# Patient Record
Sex: MALE | Race: WHITE | NOT HISPANIC OR LATINO | Employment: FULL TIME | URBAN - METROPOLITAN AREA
[De-identification: names, ages, dates, MRNs, and addresses within clinical notes are randomized per-mention and may not be internally consistent; named-entity substitution may affect disease eponyms.]

---

## 2019-06-17 ENCOUNTER — TRANSFERRED RECORDS (OUTPATIENT)
Dept: HEALTH INFORMATION MANAGEMENT | Facility: CLINIC | Age: 34
End: 2019-06-17

## 2019-06-24 ENCOUNTER — TRANSFERRED RECORDS (OUTPATIENT)
Dept: HEALTH INFORMATION MANAGEMENT | Facility: CLINIC | Age: 34
End: 2019-06-24

## 2019-06-28 ENCOUNTER — TRANSFERRED RECORDS (OUTPATIENT)
Dept: HEALTH INFORMATION MANAGEMENT | Facility: CLINIC | Age: 34
End: 2019-06-28

## 2019-08-16 ENCOUNTER — TRANSFERRED RECORDS (OUTPATIENT)
Dept: HEALTH INFORMATION MANAGEMENT | Facility: CLINIC | Age: 34
End: 2019-08-16

## 2019-10-02 ENCOUNTER — TRANSFERRED RECORDS (OUTPATIENT)
Dept: HEALTH INFORMATION MANAGEMENT | Facility: CLINIC | Age: 34
End: 2019-10-02

## 2019-10-24 ENCOUNTER — TELEPHONE (OUTPATIENT)
Dept: NEUROLOGY | Facility: CLINIC | Age: 34
End: 2019-10-24

## 2019-10-24 ENCOUNTER — PRE VISIT (OUTPATIENT)
Dept: NEUROLOGY | Facility: CLINIC | Age: 34
End: 2019-10-24

## 2019-10-24 RX ORDER — LISINOPRIL 40 MG/1
40 TABLET ORAL
COMMUNITY
Start: 2019-08-19

## 2019-10-24 RX ORDER — TRAMADOL HYDROCHLORIDE 50 MG/1
TABLET ORAL
COMMUNITY
Start: 2019-09-23

## 2019-10-24 RX ORDER — CYCLOBENZAPRINE HCL 10 MG
10 TABLET ORAL
COMMUNITY
Start: 2019-08-23 | End: 2020-08-27

## 2019-10-24 RX ORDER — HYDROCHLOROTHIAZIDE 25 MG/1
TABLET ORAL
COMMUNITY
Start: 2019-08-05 | End: 2020-08-16

## 2019-10-24 RX ORDER — OMEPRAZOLE 40 MG/1
40 CAPSULE, DELAYED RELEASE ORAL
COMMUNITY
Start: 2019-08-29

## 2019-10-24 RX ORDER — ACETAMINOPHEN 500 MG
TABLET ORAL
COMMUNITY

## 2019-10-24 RX ORDER — GABAPENTIN 300 MG/1
600 CAPSULE ORAL 3 TIMES DAILY
COMMUNITY
Start: 2019-10-16

## 2019-10-24 RX ORDER — SUCRALFATE 1 G/1
1 TABLET ORAL
COMMUNITY
Start: 2019-10-01 | End: 2019-12-30

## 2019-10-24 RX ORDER — MULTIVITAMIN WITH IRON
1 TABLET ORAL DAILY
COMMUNITY

## 2019-10-24 RX ORDER — TOPIRAMATE 50 MG/1
50 TABLET, FILM COATED ORAL
COMMUNITY
Start: 2019-10-01 | End: 2020-10-05

## 2019-10-24 NOTE — TELEPHONE ENCOUNTER
October 24, 2019              Tyesha Sierra CMA     Chief Complaint   Patient presents with     Previsit     Completed       Pre-Visit Documentation: Maurice Celestin is a 33 year old male  - patient stated he has not had any healthcare services outside of the [facility].    Current Outpatient Medications   Medication Sig Dispense Refill     acetaminophen (TYLENOL) 500 MG tablet        cyclobenzaprine (FLEXERIL) 10 MG tablet Take 10 mg by mouth       gabapentin (NEURONTIN) 300 MG capsule 600 mg 3 times daily Patient reports taking 3 caps in the AM, 3 caps in the afternoon and 3 caps in the PM       hydrochlorothiazide (HYDRODIURIL) 25 MG tablet        lisinopril (PRINIVIL/ZESTRIL) 40 MG tablet Take 40 mg by mouth       magnesium 250 MG tablet Take 1 tablet by mouth daily       omeprazole (PRILOSEC) 40 MG DR capsule Take 40 mg by mouth       sucralfate (CARAFATE) 1 GM tablet Take 1 g by mouth       topiramate (TOPAMAX) 50 MG tablet Take 50 mg by mouth       traMADol (ULTRAM) 50 MG tablet TAKE 1 TO 2 TABLETS(50  MG) BY MOUTH EVERY 6 HOURS AS NEEDED FOR SEVERE PAIN         ASSESSMENT / PLAN:  No diagnosis found.    Tyesha Sierra CMA

## 2019-10-24 NOTE — TELEPHONE ENCOUNTER
We received imaging reports and disk from [place] - sent to our imaging department for scanning.        Tyesha Sierra CMA

## 2019-10-25 ENCOUNTER — OFFICE VISIT (OUTPATIENT)
Dept: NEUROLOGY | Facility: CLINIC | Age: 34
End: 2019-10-25
Payer: COMMERCIAL

## 2019-10-25 VITALS
HEART RATE: 94 BPM | OXYGEN SATURATION: 99 % | WEIGHT: 299.8 LBS | SYSTOLIC BLOOD PRESSURE: 125 MMHG | DIASTOLIC BLOOD PRESSURE: 81 MMHG

## 2019-10-25 DIAGNOSIS — G60.9 IDIOPATHIC SMALL FIBER PERIPHERAL NEUROPATHY: Primary | ICD-10-CM

## 2019-10-25 PROCEDURE — 99202 OFFICE O/P NEW SF 15 MIN: CPT | Performed by: PSYCHIATRY & NEUROLOGY

## 2019-10-25 ASSESSMENT — PAIN SCALES - GENERAL: PAINLEVEL: EXTREME PAIN (8)

## 2019-10-25 NOTE — PROGRESS NOTES
[id number]            Mental state: Alert, appropriate, speech, language, and thought content normal.     Cranial nerves II-XII normal.    Sensory examination:   Right Left   Light touch Normal Normal   Vibration (timed)     Vibration (Rydell-Seiffer) 8 8   Pin MF MF   Position     Legend:   MM = medial malleolus, TT = tibial tuberosity, K = patella, MCP = MCP joint  MF = mid-foot, DC = distal calf, MC = mid calf, PC = proximal calf      Manual muscle testing (A indicates atrophy):   Right Left   Shoulder abduction  5 5   Elbow extension 5 5   Elbow flexion 5 5   Wrist extension  5 5   Finger extension 5 5   FDI 5 5   APB 5 5   Hip flexion 5 5   Knee flexion 5 5   Knee extension 5 5   Dorsiflexion 5 5   Plantar flexion 5 5     Muscle tone:   Right Left   Upper limb Normal Normal   Lower limb Normal Normal        Reflexes:   Right Left   Triceps 2 2   Biceps Tr 0   Brachioradialis 0 0   Patellar 2 2   Achilles 2 2   Plantar Flexor Flexor   Clonus Absent Absent      Coordination:  Finger-nose normal.  Heel-shin normal.  RRMs normal.    Gait:  Antalgic. Romberg negative.      Impression:      Recommendations:

## 2019-10-25 NOTE — NURSING NOTE
Maurice Celestin's goals for this visit include:   Chief Complaint   Patient presents with     Consult       He requests these members of his care team be copied on today's visit information:     PCP: Christie Quesada    Referring Provider:  Kota Singer MD  [facility]  [address] [place] [id number]    /81 (BP Location: Left arm, Patient Position: Sitting, Cuff Size: Adult Large)   Pulse 94   Wt 136 kg (299 lb 12.8 oz)   SpO2 99%     Do you need any medication refills at today's visit? No

## 2019-10-25 NOTE — LETTER
10/25/2019         RE: Maurice Celestin  [address]  [facility] [address]        Dear Colleague,    Thank you for referring your patient, Maurice Celestin, to the [facility]. Please see a copy of my visit note below.    [id number]            Mental state: Alert, appropriate, speech, language, and thought content normal.     Cranial nerves II-XII normal.    Sensory examination:   Right Left   Light touch Normal Normal   Vibration (timed)     Vibration (Rydell-Seiffer) 8 8   Pin MF MF   Position     Legend:   MM = medial malleolus, TT = tibial tuberosity, K = patella, MCP = MCP joint  MF = mid-foot, DC = distal calf, MC = mid calf, PC = proximal calf      Manual muscle testing (A indicates atrophy):   Right Left   Shoulder abduction  5 5   Elbow extension 5 5   Elbow flexion 5 5   Wrist extension  5 5   Finger extension 5 5   FDI 5 5   APB 5 5   Hip flexion 5 5   Knee flexion 5 5   Knee extension 5 5   Dorsiflexion 5 5   Plantar flexion 5 5     Muscle tone:   Right Left   Upper limb Normal Normal   Lower limb Normal Normal        Reflexes:   Right Left   Triceps 2 2   Biceps Tr 0   Brachioradialis 0 0   Patellar 2 2   Achilles 2 2   Plantar Flexor Flexor   Clonus Absent Absent      Coordination:  Finger-nose normal.  Heel-shin normal.  RRMs normal.    Gait:  Antalgic. Romberg negative.      Impression:      Recommendations:          2091         Christie Quesada NP   [address] [address]      Patient:  Maurice Celestin   MRN:  [id number]   :  1985      Dear Doctors:      I saw Maurice Celestin in consultation today for further evaluation of small fiber neuropathy.  Mr. Celestin is a 33-year-old man who reports an episode of severe vertigo in mid  of this year.  His blood pressure was 240/150 on that day when it was taken by his mother who is a nurse.   He reports having had a history of hypertension, but had not been undergoing treatment at the time.   Since then, he has developed a syndrome of tremors, as well as paresthesias in the feet.      The patient's full past medical history, social history, allergy list, medication list, family history, occupational history and system review are documented in the medical record and were personally reviewed by me today.  He reports having on average about 1 alcoholic beverage a day.  He does smoke.  There is no family history of similar neurological problems.      NEUROLOGIC:  As follows:     Mental state: Alert, appropriate, speech, language, and thought content normal.      Cranial nerves II-XII normal.     Sensory examination:    Right Left   Light touch Normal Normal   Vibration (timed)       Vibration (Rydell-Seiffer) 8 8   Pin MF MF   Position       Legend:   MM = medial malleolus, TT = tibial tuberosity, K = patella, MCP = MCP joint  MF = mid-foot, DC = distal calf, MC = mid calf, PC = proximal calf        Manual muscle testing (A indicates atrophy):    Right Left   Shoulder abduction        5 5   Elbow extension 5 5   Elbow flexion 5 5   Wrist extension         5 5   Finger extension 5 5   FDI 5 5   APB 5 5   Hip flexion 5 5   Knee flexion 5 5   Knee extension 5 5   Dorsiflexion 5 5   Plantar flexion 5 5      Muscle tone:    Right Left   Upper limb Normal Normal   Lower limb Normal Normal         Reflexes:    Right Left   Triceps 2 2   Biceps Tr 0   Brachioradialis 0 0   Patellar 2 2   Achilles 2 2   Plantar Flexor Flexor   Clonus Absent Absent      Coordination:  Finger-nose normal.  Heel-shin normal.  RRMs normal.     Gait:  Antalgic. Romberg negative.      MEDICAL RECORD REVIEW:  Brain MRI without contrast was normal.  Spinal fluid is entirely normal.  Routine chemistries and extensive serologies are unremarkable.      Finally, note that the patient does have a fine postural tremor which he and his family indicate has improved with gabapentin, although he still has an internal sense of shakiness.      Epidermal  nerve fiber density was reduced in the foot and normal in the thigh.      I explained the following to Mr. Malin, his wife and his mother:  I would extend the laboratory studies for small fiber neuropathy to include a hemoglobin A1c, 2-hour oral glucose tolerance test, Sjogren antibodies and Sjogren antibodies, HCV and HIV titers, serum cryoglobulin and rheumatoid factor.  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     With regard to his broader syndrome complex, I do not think that this is explained on the basis of small fiber neuropathy.  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  Treatments are available but require some effort and time.  I explained that our movement disorders providers can see him for the tremulousness and have some expertise in assessing and making recommendations in this area.            Sincerely,      EYAD BOND MD             D: 10/25/2019   T: 10/25/2019   MT: KATHY      Name:     RUDY MALIN   MRN:      [id number]        Account:      [id number]   :      1985      Document: [id number]       cc: Christie Singer MD       Again, thank you for allowing me to participate in the care of your patient.        Sincerely,        Eyad Bond MD

## 2019-10-26 NOTE — PROGRESS NOTES
2091         Christie Quesada NP   [address] [address]      Patient:  Maurice Celestin   MRN:  [id number]   :  1985      Dear Doctors:      I saw Maurice Celestin in consultation today for further evaluation of small fiber neuropathy.  Mr. Celestin is a 33-year-old man who reports an episode of severe vertigo in mid  of this year.  His blood pressure was 240/150 on that day when it was taken by his mother who is a nurse.   He reports having had a history of hypertension, but had not been undergoing treatment at the time.  Since then, he has developed a syndrome of tremors, as well as paresthesias in the feet.      The patient's full past medical history, social history, allergy list, medication list, family history, occupational history and system review are documented in the medical record and were personally reviewed by me today.  He reports having on average about 1 alcoholic beverage a day.  He does smoke.  There is no family history of similar neurological problems.      NEUROLOGIC:  As follows:     Mental state: Alert, appropriate, speech, language, and thought content normal.      Cranial nerves II-XII normal.     Sensory examination:    Right Left   Light touch Normal Normal   Vibration (timed)       Vibration (Rydell-Seiffer) 8 8   Pin MF MF   Position       Legend:   MM = medial malleolus, TT = tibial tuberosity, K = patella, MCP = MCP joint  MF = mid-foot, DC = distal calf, MC = mid calf, PC = proximal calf        Manual muscle testing (A indicates atrophy):    Right Left   Shoulder abduction        5 5   Elbow extension 5 5   Elbow flexion 5 5   Wrist extension         5 5   Finger extension 5 5   FDI 5 5   APB 5 5   Hip flexion 5 5   Knee flexion 5 5   Knee extension 5 5   Dorsiflexion 5 5   Plantar flexion 5 5      Muscle tone:    Right Left   Upper limb Normal Normal   Lower limb Normal Normal         Reflexes:    Right Left   Triceps 2 2   Biceps Tr 0    Brachioradialis 0 0   Patellar 2 2   Achilles 2 2   Plantar Flexor Flexor   Clonus Absent Absent      Coordination:  Finger-nose normal.  Heel-shin normal.  RRMs normal.     Gait:  Antalgic. Romberg negative.      MEDICAL RECORD REVIEW:  Brain MRI without contrast was normal.  Spinal fluid is entirely normal.  Routine chemistries and extensive serologies are unremarkable.      Finally, note that the patient does have a fine postural tremor which he and his family indicate has improved with gabapentin, although he still has an internal sense of shakiness.      Epidermal nerve fiber density was reduced in the foot and normal in the thigh.      I explained the following to Mr. Malin, his wife and his mother:  I would extend the laboratory studies for small fiber neuropathy to include a hemoglobin A1c, 2-hour oral glucose tolerance test, Sjogren antibodies and Sjogren antibodies, HCV and HIV titers, serum cryoglobulin and rheumatoid factor.  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     With regard to his broader syndrome complex, I do not think that this is explained on the basis of small fiber neuropathy.  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  Treatments are available but require some effort and time.  I explained that our movement disorders providers can see him for the tremulousness and have some expertise in assessing and making recommendations in this area.            Sincerely,      EYAD BOND MD             D: 10/25/2019   T: 10/25/2019   MT: KATHY      Name:     RUDY MALIN    MRN:      [id number]        Account:      [id number]   :      1985      Document: [id number]       cc: Christie Singer MD

## 2019-10-31 ENCOUNTER — PRE VISIT (OUTPATIENT)
Dept: NEUROLOGY | Facility: CLINIC | Age: 34
End: 2019-10-31

## 2019-10-31 NOTE — TELEPHONE ENCOUNTER
FUTURE VISIT INFORMATION      FUTURE VISIT INFORMATION:    Date: 12/3/2019    Time: 3pm    Location: [facility]  REFERRAL INFORMATION:    Referring provider:  Dr. Chand     Referring providers clinic:  [facility]     Reason for visit/diagnosis  Tremors     RECORDS REQUESTED FROM:       Clinic name Comments Records Status Imaging Status   [facility] Everywhere PACS

## 2019-12-03 ENCOUNTER — OFFICE VISIT (OUTPATIENT)
Dept: NEUROLOGY | Facility: CLINIC | Age: 34
End: 2019-12-03
Payer: COMMERCIAL

## 2019-12-03 VITALS
SYSTOLIC BLOOD PRESSURE: 109 MMHG | HEIGHT: 78 IN | HEART RATE: 107 BPM | WEIGHT: 290 LBS | OXYGEN SATURATION: 98 % | BODY MASS INDEX: 33.55 KG/M2 | DIASTOLIC BLOOD PRESSURE: 72 MMHG

## 2019-12-03 DIAGNOSIS — R26.9 GAIT DISORDER: ICD-10-CM

## 2019-12-03 DIAGNOSIS — R25.1 TREMOR: ICD-10-CM

## 2019-12-03 DIAGNOSIS — R25.1 TREMOR: Primary | ICD-10-CM

## 2019-12-03 LAB — T4 FREE SERPL-MCNC: 0.7 NG/DL (ref 0.76–1.46)

## 2019-12-03 ASSESSMENT — MIFFLIN-ST. JEOR: SCORE: 2388.68

## 2019-12-03 ASSESSMENT — PATIENT HEALTH QUESTIONNAIRE - PHQ9
10. IF YOU CHECKED OFF ANY PROBLEMS, HOW DIFFICULT HAVE THESE PROBLEMS MADE IT FOR YOU TO DO YOUR WORK, TAKE CARE OF THINGS AT HOME, OR GET ALONG WITH OTHER PEOPLE: EXTREMELY DIFFICULT
SUM OF ALL RESPONSES TO PHQ QUESTIONS 1-9: 18
SUM OF ALL RESPONSES TO PHQ QUESTIONS 1-9: 18

## 2019-12-03 ASSESSMENT — PAIN SCALES - GENERAL: PAINLEVEL: EXTREME PAIN (8)

## 2019-12-03 NOTE — NURSING NOTE
Chief Complaint   Patient presents with     Consult     [facility] MOVEMENT DISORDER TREMORS      Alejandrina Fuller, EMT

## 2019-12-03 NOTE — LETTER
12/3/2019       RE: Maurice Celestin  [address]  [facility] [address]     Dear Colleague,    Thank you for referring your patient, Maurice Celestin, to the [facility] at [facility]. Please see a copy of my visit note below.    Department of Neurology  Movement Disorders Division   Initial Clinic Evaluation     Patient: Maurice Celestin   MRN: [id number]   : 1985   Date of Visit: 12/3/2019     Referring Physician: Mannie    Reason for Referral: Tremor    Impression:  1.  Exaggerated physiologic tremor  2.  Multiple somatic complaints, including headache, gait disorder, numbness and migratory pain  3.  Markedly elevated blood pressure in past, normal today    The one thing that I would want to rule out would be a pheochromocytoma.  This is unlikely but some of his symptoms could be explained by an extreme hyperadrenergic state.  I am going to check 24-hour metanephrines.  If these are normal I think it rules out pheochromocytoma.  I will also check a free T4 as his sensitive TSH was on the low side.  I will check a paraneoplastic panel to look for the acetylcholine receptor ganglionic antibody although I doubt that he has the syndrome of acute dysautonomia.    I was diana with the patient and indicated that if these tests were negative the most likely diagnosis would be a hyperadrenergic state due to anxiety.  The patient and his family are  strongly against considering this as a diagnostic possibility.  We did talk about doing autonomic testing at [facility] and I could order this if other testing were negative.    Recommendations:  1.  24-hour urinary metanephrines  2.  Spot fractionated catecholamines to see if he is hyperadrenergic for any reason  3.  Free T4  4.  Paraneoplastic panel    I will call the patient with laboratory results.     Please call or write with questions or concerns,    Sincerely yours,    Leonid Bravo MD ,  "MD      History of Present Illness  Mr. Celestin is a 33 year old male who is right-handed.  He has not worked since June when he had onset of his current illness.  The patient comes today with his wife and his mother.  He is referred by Doug Chand for evaluation of tremor.    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  He had a sudden ictus.  He had a hot sensation in his pharynx and then had a sensation of pressure in his head.  He collapsed against his side window but did not pass out.  He became dizzy and lightheaded.  He eemed to sway back-and-forth in his visual field.  He became nauseated and tried not to vomit.  He was able to get his head up off the window.  He drove to work and did work that day for several hours.  He was not feeling well.  After work he went to his mother's and his blood pressure was 250/147.  He did not go to the hospital.  On the way home he checked his blood pressure at a drugstore and it was 170/100.  He states that since that day \"nothing is the same\".  He says it \"really wrote something in my system\".  Nothing seems to work the way it did when he felt well.    He states that 1 of his symptoms his tremor.  He has mainly a sense of internal tremor.  At other times his limbs including his arms and legs will tremor severely and fasciculate.  At nighttime his mother notes his muscles will tremor especially in his feet.  He notices his muscles jerking like myoclonus during sleep.  He saw Dr. Singer a neurologist in his home area.  Gabapentin was tried and recently increased to 900 mg 3 times a day.  He feels this is helpful.  Flexeril was also added for sleep.    He says the tremor is now interfering with his action.  He tried to do some wood-burning.  He would hold his hand tightly to keep it from shaking.  When he did that his left leg began to shake violently.  He has twitching of his pectoralis muscles.  He states he can feel twitching in his stomach or abdomen.    He " "has had digestive issues.  His stomach hurts all the time.  He says he can \"feel digestion.  He has random pains throughout his body especially in his neck.  He sleeps poorly every night.  His vision is foggy.  He is developed severe headaches.  He also has episodes of severe sweating.  He does not have episodes of palpitation.    Given his dramatic presentation Dr. Singer has done a full work-up.  An MRI of the head was performed and I reviewed this and its entirely normal.  The patient had a spinal fluid exam.  He had 2 new chelated cells.  His protein was 43 and glucose 78.  There were no oligoclonal bands.  Lyme titer was negative.  West Nile titer was negative.  Ehrlichia/Anaplasma was negative and PCR in the blood.  Sensitive TSH was below 1 but within normal limits.  HIV titer is normal.  I believe an EMG was negative.  An epidermal nerve fiber density was done and said to be reduced.  Dr. Doug dave saw him for this and feels that small fiber neuropathy is unlikely and does not explain the patient's problems.    Past Medical History:   No past medical history on file.    Past Surgical History:   No past surgical history on file.    Medications:  Current Outpatient Medications   Medication Sig Dispense Refill     acetaminophen (TYLENOL) 500 MG tablet        cyclobenzaprine (FLEXERIL) 10 MG tablet Take 10 mg by mouth       gabapentin (NEURONTIN) 300 MG capsule 600 mg 3 times daily Patient reports taking 3 caps in the AM, 3 caps in the afternoon and 3 caps in the PM       hydrochlorothiazide (HYDRODIURIL) 25 MG tablet        lisinopril (PRINIVIL/ZESTRIL) 40 MG tablet Take 40 mg by mouth       magnesium 250 MG tablet Take 1 tablet by mouth daily       omeprazole (PRILOSEC) 40 MG DR capsule Take 40 mg by mouth       sucralfate (CARAFATE) 1 GM tablet Take 1 g by mouth       topiramate (TOPAMAX) 50 MG tablet Take 50 mg by mouth       traMADol (ULTRAM) 50 MG tablet TAKE 1 TO 2 TABLETS(50  MG) BY MOUTH EVERY 6 " HOURS AS NEEDED FOR SEVERE PAIN       UNABLE TO FIND MEDICATION NAME: Sulfur zyme [place]       HEMP OIL OR EXTRACT OR OTHER CBD CANNABINOID, NOT MEDICAL CANNABIS, 30 mg            Movement Disorder-related Medications                                                                                                                                                             Allergies: is allergic to meperidine; neomycin-bacitracin-polymyxin; and sertraline.    Social History:   Social History     Socioeconomic History     Marital status:      Spouse name: None     Number of children: None     Years of education: None     Highest education level: None   Occupational History     None   Social Needs     Financial resource strain: None     Food insecurity:     Worry: None     Inability: None     Transportation needs:     Medical: None     Non-medical: None   Tobacco Use     Smoking status: Current Every Day Smoker     Packs/day: 1.00     Smokeless tobacco: Never Used   Substance and Sexual Activity     Alcohol use: None     Drug use: None     Sexual activity: None   Lifestyle     Physical activity:     Days per week: None     Minutes per session: None     Stress: None   Relationships     Social connections:     Talks on phone: None     Gets together: None     Attends [employer] service: None     Active member of club or organization: None     Attends meetings of clubs or organizations: None     Relationship status: None     Intimate partner violence:     Fear of current or ex partner: None     Emotionally abused: None     Physically abused: None     Forced sexual activity: None   Other Topics Concern     None   Social History Narrative     None       Family History:  No family history on file.    ROS:    Neurologic  Visual loss: YES,fogginess Double vision: no, Headache: YES, Loss of consciousness:  no, Seizure: no, Fainting (syncope): no, Dysarthria: no, Dysphagia:  no, Vertigo:  no, Weakness: no, Atrophy: no,  Twitches: no, Lhermitte's: no, Numbness or tingling: no, Handwriting change: no, Tremors: no, Involuntary movements: no, Imbalance: no, Abnormal gait:  no, Falls :no, Memory loss: no, RBD: no, Sleep disorder: no, Hallucination: no, Loss of concentration: no,  Behavioral change: no,  Loss of motivation: no      Comprehensive Neurologic Exam    Mental Status Exam   The patient is alert, oriented and exhibits no difficulty with cognition or memory.  A formal short test of mental status was performed.     Neurovascular         Speech and Language   Right Left     Carotid Bruit Absent Absent  Speech is normal.   Dorsalis Pedis Pulse Normal Normal  Description   Posterior Tibial Pulse Normal Normal  Language is normal.     Cranial Nerve Exam                 Right Left   Right Left   II                                        Normal Normal  Hearing (VIII) Normal Normal      III, IV, V!                   Normal Normal  Nystagmus (VIII) Normal Normal   EOM description                              Gag (IX, X) Normal Normal   Facial Sensation (V) Normal Normal  SCM (XI) Normal Normal   Muscles of Mastication (V) Normal Normal  Trapezius (XI) Normal Normal   Facial Strength (VII) Normal Normal  Tongue (XII) Normal Normal     Motor Exam  Strength (*/5 grading)  Muscle                  Right Left  Muscle Right Left   Frontalis                                           Normal Normal  Iliopsoas Normal    Normal          Orbicularis Oculi                     Normal Normal  Quadrideps Normal    Normal        Orbicularis Oris                         Normal Normal  Anterior Tibial Normal Normal      Deltoid Normal Normal  Gastrocnemius Normal    Normal   Biceps Normal Normal  Extensor Hallucis Longus Normal    Normal   Triceps Normal Normal  Toe Extensors Normal    Normal   EDC Normal Normal  Toe Flexor Normal    Normal   Finger Flexors Normal Normal  Other Normal Normal   First Dorsal Interosseous Normal Normal  Other Normal Normal    Hypothenar Normal Normal  Other Normal Normal   Thenar Normal Normal  Other Normal Normal     Reflexes      Tone   Right Left   Right Left   Biceps Normal Normal  Spasticity (S)  Rigidity (R)     Triceps Normal Normal  Neck     Brachioradialis Normal Normal  Arm     Quadriceps Normal Normal  Leg     Ankle Normal Normal       Babkinski Flexor Flexor         AMR       Coordination   Right Left   Right Left   Fingers Normal Normal  Finger nose finger Normal Normal   Hand Normal Normal  Drift Normal Normal   Leg Normal Normal  Heel Esquivel Normal Normal   Foot Normal Normal  Other     Other               Involuntary Movements    Gait and Station  None            Right Left  Stand & Sit Normal   (Movement type) Normal Normal  Gait Normal   (Movement type) Normal Normal  Tandem Normal   (Movement type) Normal Normal  Romberg Absent   Fine 9 hz action tremor both hands    Sensory Exam          Right Left    Pin Normal Normal    Vibration Normal Normal    Joint position Normal Normal    Other           Coding statement:     Duration of  Services: face-to-face 80 min.   Greater than 50% of this visit was spent in counseling and coordination of care.    Again, thank you for allowing me to participate in the care of your patient.      Sincerely,    Leonid Bravo MD

## 2019-12-03 NOTE — PROGRESS NOTES
Department of Neurology  Movement Disorders Division   Initial Clinic Evaluation     Patient: Maurice Celestin   MRN: [id number]   : 1985   Date of Visit: 12/3/2019     Referring Physician: Mannie    Reason for Referral: Tremor    Impression:  1.  Exaggerated physiologic tremor  2.  Multiple somatic complaints, including headache, gait disorder, numbness and migratory pain  3.  Markedly elevated blood pressure in past, normal today    The one thing that I would want to rule out would be a pheochromocytoma.  This is unlikely but some of his symptoms could be explained by an extreme hyperadrenergic state.  I am going to check 24-hour metanephrines.  If these are normal I think it rules out pheochromocytoma.  I will also check a free T4 as his sensitive TSH was on the low side.  I will check a paraneoplastic panel to look for the acetylcholine receptor ganglionic antibody although I doubt that he has the syndrome of acute dysautonomia.    I was diana with the patient and indicated that if these tests were negative the most likely diagnosis would be a hyperadrenergic state due to anxiety.  The patient and his family are  strongly against considering this as a diagnostic possibility.  We did talk about doing autonomic testing at [facility] and I could order this if other testing were negative.    Recommendations:  1.  24-hour urinary metanephrines  2.  Spot fractionated catecholamines to see if he is hyperadrenergic for any reason  3.  Free T4  4.  Paraneoplastic panel    I will call the patient with laboratory results.       Please call or write with questions or concerns,    Sincerely yours,    Leonid Bravo MD , MD      History of Present Illness  Mr. Celestin is a 33 year old male who is right-handed.  He has not worked since  when he had onset of his current illness.  The patient comes today with his wife and his mother.  He is referred by Doug Chand for evaluation of tremor.    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  He had a sudden ictus.  He had a hot sensation in his pharynx and then had a sensation of pressure in his head.  He collapsed against his side window but did not pass out.  He became dizzy and lightheaded.  He eemed to sway back-and-forth in his visual field.  He became nauseated and tried not to vomit.  He was able to get his head up off the window.  He drove to work and did work that day for several hours.  He was not feeling well.  After work he went to his mother's and his blood pressure was 250/147.  He did not go to the hospital.  On the way home he checked his blood pressure at a drugstore and it was 170/100.  He states that since that day \"nothing is the same\".  He says it \"really wrote something in my system\".  Nothing seems to work the way it did when he felt well.    He states that 1 of his symptoms his tremor.  He has mainly a sense of internal tremor.  At other times his limbs including his arms and legs will tremor severely and fasciculate.  At nighttime his mother notes his muscles will tremor especially in his feet.  He notices his muscles jerking like myoclonus during sleep.  He saw Dr. Singer a neurologist in his home area.  Gabapentin was tried and recently increased to 900 mg 3 times a day.  He feels this is helpful.  Flexeril was also added for sleep.    He says the tremor is now interfering with his action.  He tried to do some wood-burning.  He would hold his hand tightly to keep it from shaking.  When he did that his left leg began to shake violently.  He has twitching of his pectoralis muscles.  He states he can feel twitching in his stomach or abdomen.    He has had digestive issues.  His stomach hurts all the time.  He says he can \"feel digestion.  He has random pains throughout his body especially in his neck.  He sleeps poorly every night.  His vision is foggy.  He is developed severe headaches.  He also has episodes of severe " sweating.  He does not have episodes of palpitation.    Given his dramatic presentation Dr. Singer has done a full work-up.  An MRI of the head was performed and I reviewed this and its entirely normal.  The patient had a spinal fluid exam.  He had 2 new chelated cells.  His protein was 43 and glucose 78.  There were no oligoclonal bands.  Lyme titer was negative.  West Nile titer was negative.  Ehrlichia/Anaplasma was negative and PCR in the blood.  Sensitive TSH was below 1 but within normal limits.  HIV titer is normal.  I believe an EMG was negative.  An epidermal nerve fiber density was done and said to be reduced.  Dr. Doug dave saw him for this and feels that small fiber neuropathy is unlikely and does not explain the patient's problems.        Past Medical History:   No past medical history on file.    Past Surgical History:   No past surgical history on file.    Medications:  Current Outpatient Medications   Medication Sig Dispense Refill     acetaminophen (TYLENOL) 500 MG tablet        cyclobenzaprine (FLEXERIL) 10 MG tablet Take 10 mg by mouth       gabapentin (NEURONTIN) 300 MG capsule 600 mg 3 times daily Patient reports taking 3 caps in the AM, 3 caps in the afternoon and 3 caps in the PM       hydrochlorothiazide (HYDRODIURIL) 25 MG tablet        lisinopril (PRINIVIL/ZESTRIL) 40 MG tablet Take 40 mg by mouth       magnesium 250 MG tablet Take 1 tablet by mouth daily       omeprazole (PRILOSEC) 40 MG DR capsule Take 40 mg by mouth       sucralfate (CARAFATE) 1 GM tablet Take 1 g by mouth       topiramate (TOPAMAX) 50 MG tablet Take 50 mg by mouth       traMADol (ULTRAM) 50 MG tablet TAKE 1 TO 2 TABLETS(50  MG) BY MOUTH EVERY 6 HOURS AS NEEDED FOR SEVERE PAIN       UNABLE TO FIND MEDICATION NAME: Sulfur zyme [place]       HEMP OIL OR EXTRACT OR OTHER CBD CANNABINOID, NOT MEDICAL CANNABIS, 30 mg            Movement Disorder-related Medications                                                                                                                                                              Allergies: is allergic to meperidine; neomycin-bacitracin-polymyxin; and sertraline.    Social History:   Social History     Socioeconomic History     Marital status:      Spouse name: None     Number of children: None     Years of education: None     Highest education level: None   Occupational History     None   Social Needs     Financial resource strain: None     Food insecurity:     Worry: None     Inability: None     Transportation needs:     Medical: None     Non-medical: None   Tobacco Use     Smoking status: Current Every Day Smoker     Packs/day: 1.00     Smokeless tobacco: Never Used   Substance and Sexual Activity     Alcohol use: None     Drug use: None     Sexual activity: None   Lifestyle     Physical activity:     Days per week: None     Minutes per session: None     Stress: None   Relationships     Social connections:     Talks on phone: None     Gets together: None     Attends [employer] service: None     Active member of club or organization: None     Attends meetings of clubs or organizations: None     Relationship status: None     Intimate partner violence:     Fear of current or ex partner: None     Emotionally abused: None     Physically abused: None     Forced sexual activity: None   Other Topics Concern     None   Social History Narrative     None       Family History:  No family history on file.    ROS:    Neurologic  Visual loss: YES,fogginess Double vision: no, Headache: YES, Loss of consciousness:  no, Seizure: no, Fainting (syncope): no, Dysarthria: no, Dysphagia:  no, Vertigo:  no, Weakness: no, Atrophy: no, Twitches: no, Lhermitte's: no, Numbness or tingling: no, Handwriting change: no, Tremors: no, Involuntary movements: no, Imbalance: no, Abnormal gait:  no, Falls :no, Memory loss: no, RBD: no, Sleep disorder: no, Hallucination: no, Loss of concentration: no,  Behavioral  change: no,  Loss of motivation: no      Comprehensive Neurologic Exam    Mental Status Exam   The patient is alert, oriented and exhibits no difficulty with cognition or memory.  A formal short test of mental status was performed.     Neurovascular         Speech and Language   Right Left     Carotid Bruit Absent Absent  Speech is normal.   Dorsalis Pedis Pulse Normal Normal  Description   Posterior Tibial Pulse Normal Normal  Language is normal.     Cranial Nerve Exam                 Right Left   Right Left   II                                        Normal Normal  Hearing (VIII) Normal Normal      III, IV, V!                   Normal Normal  Nystagmus (VIII) Normal Normal   EOM description                              Gag (IX, X) Normal Normal   Facial Sensation (V) Normal Normal  SCM (XI) Normal Normal   Muscles of Mastication (V) Normal Normal  Trapezius (XI) Normal Normal   Facial Strength (VII) Normal Normal  Tongue (XII) Normal Normal     Motor Exam  Strength (*/5 grading)  Muscle                  Right Left  Muscle Right Left   Frontalis                                           Normal Normal  Iliopsoas Normal    Normal          Orbicularis Oculi                     Normal Normal  Quadrideps Normal    Normal        Orbicularis Oris                         Normal Normal  Anterior Tibial Normal Normal      Deltoid Normal Normal  Gastrocnemius Normal    Normal   Biceps Normal Normal  Extensor Hallucis Longus Normal    Normal   Triceps Normal Normal  Toe Extensors Normal    Normal   EDC Normal Normal  Toe Flexor Normal    Normal   Finger Flexors Normal Normal  Other Normal Normal   First Dorsal Interosseous Normal Normal  Other Normal Normal   Hypothenar Normal Normal  Other Normal Normal   Thenar Normal Normal  Other Normal Normal     Reflexes      Tone   Right Left   Right Left   Biceps Normal Normal  Spasticity (S)  Rigidity (R)     Triceps Normal Normal  Neck     Brachioradialis Normal Normal  Arm      Quadriceps Normal Normal  Leg     Ankle Normal Normal       Babkinski Flexor Flexor         AMR       Coordination   Right Left   Right Left   Fingers Normal Normal  Finger nose finger Normal Normal   Hand Normal Normal  Drift Normal Normal   Leg Normal Normal  Heel Esquivel Normal Normal   Foot Normal Normal  Other     Other               Involuntary Movements    Gait and Station  None            Right Left  Stand & Sit Normal   (Movement type) Normal Normal  Gait Normal   (Movement type) Normal Normal  Tandem Normal   (Movement type) Normal Normal  Romberg Absent   Fine 9 hz action tremor both hands    Sensory Exam          Right Left    Pin Normal Normal    Vibration Normal Normal    Joint position Normal Normal    Other             Coding statement:     Duration of  Services: face-to-face 80 min.   Greater than 50% of this visit was spent in counseling and coordination of care.                  Answers for HPI/ROS submitted by the patient on 12/3/2019   If you checked off any problems, how difficult have these problems made it for you to do your work, take care of things at home, or get along with other people?: Extremely difficult  PHQ9 TOTAL SCORE: 18

## 2019-12-04 ASSESSMENT — PATIENT HEALTH QUESTIONNAIRE - PHQ9: SUM OF ALL RESPONSES TO PHQ QUESTIONS 1-9: 18

## 2019-12-06 DIAGNOSIS — R25.1 TREMOR: ICD-10-CM

## 2019-12-06 PROCEDURE — 99000 SPECIMEN HANDLING OFFICE-LAB: CPT | Performed by: PSYCHIATRY & NEUROLOGY

## 2019-12-06 PROCEDURE — 83835 ASSAY OF METANEPHRINES: CPT | Mod: 90 | Performed by: PSYCHIATRY & NEUROLOGY

## 2019-12-08 LAB
CATECHOLS UR-IMP: ABNORMAL
COLLECT DURATION TIME SPEC: ABNORMAL H
CREAT 24H UR-MRATE: ABNORMAL MG/D (ref 1000–2500)
CREAT UR-MCNC: 129 MG/DL
DOPAMINE 24H UR-MRATE: ABNORMAL UG/D (ref 71–485)
DOPAMINE UR-MCNC: 21 UG/L
DOPAMINE/CREAT UR: 284 UG/G CRT (ref 0–250)
EPINEPH 24H UR-MRATE: ABNORMAL (ref 1–14)
EPINEPH UR-MCNC: 367 UG/L
EPINEPH/CREAT UR: 16 UG/G CRT (ref 0–20)
NOREPINEPH 24H UR-MRATE: ABNORMAL UG/D (ref 14–120)
NOREPINEPH UR-MCNC: 131 UG/L
NOREPINEPH/CREAT UR: 102 UG/G CRT (ref 0–45)
SPECIMEN VOL ?TM UR: ABNORMAL ML

## 2019-12-09 LAB
COLLECT DURATION TIME SPEC: 24 H
CREAT 24H UR-MRATE: 1930 MG/D (ref 1000–2500)
CREAT UR-MCNC: 78 MG/DL
METANEPH 24H UR-MCNC: 63 UG/L
METANEPH 24H UR-MRATE: 156 UG/D (ref 55–320)
METANEPH+NORMETANEPH UR-IMP: ABNORMAL
METANEPH/CREAT 24H UR: 81 UG/G CRT (ref 0–300)
NORMETANEPHRINE 24H UR-MCNC: 315 UG/L
NORMETANEPHRINE 24H UR-MRATE: 780 UG/D (ref 114–865)
NORMETANEPHRINE/CREAT 24H UR: 404 UG/G CRT (ref 0–400)
PNP ABY SERUM: NORMAL
SPECIMEN VOL ?TM UR: ABNORMAL ML

## 2020-03-11 ENCOUNTER — HEALTH MAINTENANCE LETTER (OUTPATIENT)
Age: 35
End: 2020-03-11

## 2021-01-03 ENCOUNTER — HEALTH MAINTENANCE LETTER (OUTPATIENT)
Age: 36
End: 2021-01-03

## 2021-04-25 ENCOUNTER — HEALTH MAINTENANCE LETTER (OUTPATIENT)
Age: 36
End: 2021-04-25

## 2021-10-10 ENCOUNTER — HEALTH MAINTENANCE LETTER (OUTPATIENT)
Age: 36
End: 2021-10-10

## 2022-05-22 ENCOUNTER — HEALTH MAINTENANCE LETTER (OUTPATIENT)
Age: 37
End: 2022-05-22

## 2022-09-18 ENCOUNTER — HEALTH MAINTENANCE LETTER (OUTPATIENT)
Age: 37
End: 2022-09-18

## 2023-06-04 ENCOUNTER — HEALTH MAINTENANCE LETTER (OUTPATIENT)
Age: 38
End: 2023-06-04